# Patient Record
Sex: MALE | Race: ASIAN | NOT HISPANIC OR LATINO | URBAN - METROPOLITAN AREA
[De-identification: names, ages, dates, MRNs, and addresses within clinical notes are randomized per-mention and may not be internally consistent; named-entity substitution may affect disease eponyms.]

---

## 2019-07-19 ENCOUNTER — EMERGENCY (EMERGENCY)
Facility: HOSPITAL | Age: 54
LOS: 1 days | Discharge: HOME | End: 2019-07-19
Attending: EMERGENCY MEDICINE | Admitting: EMERGENCY MEDICINE
Payer: SELF-PAY

## 2019-07-19 VITALS
HEIGHT: 69 IN | SYSTOLIC BLOOD PRESSURE: 133 MMHG | WEIGHT: 169.98 LBS | TEMPERATURE: 98 F | DIASTOLIC BLOOD PRESSURE: 81 MMHG | RESPIRATION RATE: 17 BRPM | HEART RATE: 70 BPM | OXYGEN SATURATION: 100 %
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
## 2019-07-19 DIAGNOSIS — M79.672 PAIN IN LEFT FOOT: ICD-10-CM

## 2019-07-19 DIAGNOSIS — Z91.018 ALLERGY TO OTHER FOODS: ICD-10-CM
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
## 2019-07-19 DIAGNOSIS — S92.352A DISPLACED FRACTURE OF FIFTH METATARSAL BONE, LEFT FOOT, INITIAL ENCOUNTER FOR CLOSED FRACTURE: ICD-10-CM

## 2019-07-19 PROCEDURE — 73610 X-RAY EXAM OF ANKLE: CPT | Mod: 26,LT

## 2019-07-19 PROCEDURE — 99284 EMERGENCY DEPT VISIT MOD MDM: CPT

## 2019-07-19 PROCEDURE — 99053 MED SERV 10PM-8AM 24 HR FAC: CPT

## 2019-07-20 PROCEDURE — 73630 X-RAY EXAM OF FOOT: CPT

## 2019-07-20 PROCEDURE — 73610 X-RAY EXAM OF ANKLE: CPT

## 2019-07-20 PROCEDURE — 99284 EMERGENCY DEPT VISIT MOD MDM: CPT | Mod: 25

## 2019-07-20 PROCEDURE — 73630 X-RAY EXAM OF FOOT: CPT | Mod: 26,LT

## 2019-07-20 RX ORDER — IBUPROFEN 200 MG
600 TABLET ORAL ONCE
Refills: 0 | Status: COMPLETED | OUTPATIENT
Start: 2019-07-20 | End: 2019-07-20

## 2019-07-20 RX ADMIN — Medication 600 MILLIGRAM(S): at 02:37

## 2019-07-20 NOTE — ED PROVIDER NOTE - PHYSICAL EXAMINATION
GENERAL: wells appearing, no acute distress   HEAD: atraumatic   EYES: EOMI, pink conjunctiva   ENT: moist oral mucosa   CARDIAC: RRR, no edema, distal pulses present   RESPIRATORY: lungs CTAB, no increased work of breathing   GASTROINTESTINAL: no abdominal tenderness, no rebound or guarding, bowel sounds presents  GENITOURINARY: no CVA tenderness   MUSCULOSKELETAL: mild swelling to L foot with ttp of base of 5th metatarsal; antalgic gait; good distal pulses and cap refill   NEUROLOGICAL: AAOx3, spontaneous movement of extremities   SKIN: intact   PSYCHIATRIC: cooperative  HEME LYMPH: no lymphadenopathy

## 2019-07-20 NOTE — ED PROVIDER NOTE - OBJECTIVE STATEMENT
L ankle/foot pain 54 yo M denies pmh presents with L foot pain after slipping on step earlier today. Can walk but pain worse with palpations and walking. Located in lateral aspect of foot. Denies other acute complaints.

## 2019-07-20 NOTE — ED PROVIDER NOTE - PROGRESS NOTE DETAILS
xray foot - avulsion fx at base of 5th metatarsal (discussed with radiologist)   Give hard sole cast shoe and cane. Will fu with podiatry. Discussed indications for patient return to ED. Patient understood.